# Patient Record
Sex: FEMALE | Race: WHITE
[De-identification: names, ages, dates, MRNs, and addresses within clinical notes are randomized per-mention and may not be internally consistent; named-entity substitution may affect disease eponyms.]

---

## 2021-07-06 ENCOUNTER — HOSPITAL ENCOUNTER (EMERGENCY)
Dept: HOSPITAL 61 - ER | Age: 61
Discharge: HOME | End: 2021-07-06
Payer: SELF-PAY

## 2021-07-06 VITALS — WEIGHT: 162.26 LBS | HEIGHT: 66 IN | BODY MASS INDEX: 26.08 KG/M2

## 2021-07-06 VITALS — DIASTOLIC BLOOD PRESSURE: 78 MMHG | SYSTOLIC BLOOD PRESSURE: 131 MMHG

## 2021-07-06 DIAGNOSIS — N39.0: Primary | ICD-10-CM

## 2021-07-06 DIAGNOSIS — Z98.51: ICD-10-CM

## 2021-07-06 DIAGNOSIS — K21.9: ICD-10-CM

## 2021-07-06 DIAGNOSIS — Z88.0: ICD-10-CM

## 2021-07-06 DIAGNOSIS — Z90.49: ICD-10-CM

## 2021-07-06 DIAGNOSIS — F17.200: ICD-10-CM

## 2021-07-06 LAB
APTT PPP: YELLOW S
BACTERIA #/AREA URNS HPF: (no result) /HPF
BILIRUB UR QL STRIP: NEGATIVE
FIBRINOGEN PPP-MCNC: (no result) MG/DL
NITRITE UR QL STRIP: POSITIVE
PH UR STRIP: 6.5 [PH]
PROT UR STRIP-MCNC: 100 MG/DL
RBC #/AREA URNS HPF: (no result) /HPF (ref 0–2)
UROBILINOGEN UR-MCNC: 1 MG/DL
WBC #/AREA URNS HPF: (no result) /HPF (ref 0–4)

## 2021-07-06 PROCEDURE — 99283 EMERGENCY DEPT VISIT LOW MDM: CPT

## 2021-07-06 PROCEDURE — 81001 URINALYSIS AUTO W/SCOPE: CPT

## 2021-07-06 PROCEDURE — 87086 URINE CULTURE/COLONY COUNT: CPT

## 2021-07-06 NOTE — PHYS DOC
Past Medical History


Past Medical History:  Depression, GERD


Past Surgical History:  Cholecystectomy, Tonsillectomy, Tubal ligation


Smoking Status:  Current Every Day Smoker


Additional Information:  


1 PPD


Alcohol Use:  None


Drug Use:  None





General Adult


EDM:


Chief Complaint:  PAIN ON URINATION





HPI:


HPI:





Patient is a 60-year-old female complaining for UTI symptoms.  She has had x1 

UTI in the past and reports symptoms today feel similar.  Onset was 3 days ago 

without any known inciting event, trauma, ingestion or known exposure.  Reports 

dysuria, increased frequency and at times incomplete bladder emptying.  No fever

or flank pain.  No other comorbid conditions, takes no medications on a daily 

basis.  No vaginal bleeding or discharge





Review of Systems:


Review of Systems:


Fourteen body systems of review of systems have been reviewed. See HPI for 

pertinent positives and negative responses, other wise all other systems are 

negative, non-pertinent or non-contributory





Heart Score:


C/O Chest Pain:  No


Risk Factors:


Risk Factors:  DM, Current or recent (<one month) smoker, HTN, HLP, family his

tory of CAD, obesity.


Risk Scores:


Score 0 - 3:  2.5% MACE over next 6 weeks - Discharge Home


Score 4 - 6:  20.3% MACE over next 6 weeks - Admit for Clinical Observation


Score 7 - 10:  72.7% MACE over next 6 weeks - Early Invasive Strategies





Allergies:


Allergies:





Allergies








Coded Allergies Type Severity Reaction Last Updated Verified


 


  Penicillins Allergy Intermediate RASH 7/6/21 Yes











Physical Exam:


PE:


Constitutional: Well developed, well nourished, no acute distress, non-toxic 

appearance. 


HENT: Normocephalic, atraumatic, bilateral external ears normal, oropharynx 

moist, no oral exudates, nose normal. 


Eyes: PERRLA, EOMI, conjunctiva normal, no discharge.  


Neck: Normal range of motion, no tenderness, supple, no stridor.  


Cardiovascular: Heart rate regular, sinus rhythm, no murmurs rubs or gallops


Lungs & Thorax:  Bilateral breath sounds clear to auscultation 


Abdomen: Bowel sounds normal, soft, suprapubic tenderness with palpation, no 

guarding or rebound, no masses, no pulsatile masses.  Nonsurgical abdomen, no 

peritoneal signs


Skin: Warm, dry, no erythema, no rash.  


Back: No tenderness, no CVA tenderness.  


Extremities: No tenderness, no cyanosis, no clubbing, ROM intact, no edema.  


Neurologic: Alert and oriented X 3, grossly normal motor & sensory function, no 

focal deficits noted. 


Psychologic: Anxious affect and mood





Current Patient Data:


Labs:





Laboratory Tests








Test


 7/6/21


16:23


 


Urine Collection Type Unknown 


 


Urine Color Yellow 


 


Urine Clarity Cloudy 


 


Urine pH 6.5 


 


Urine Specific Gravity 1.010 


 


Urine Protein 100 mg/dL 


 


Urine Glucose (UA) Negative mg/dL 


 


Urine Ketones (Stick) Negative mg/dL 


 


Urine Blood Large 


 


Urine Nitrite Positive 


 


Urine Bilirubin Negative 


 


Urine Urobilinogen Dipstick 1.0 mg/dL 


 


Urine Leukocyte Esterase Large 


 


Urine RBC 20-40 /HPF 


 


Urine WBC Tntc /HPF 


 


Urine Squamous Epithelial


Cells Few /LPF 





 


Urine Bacteria Few /HPF 








Vital Signs:





                                   Vital Signs








  Date Time  Temp Pulse Resp B/P (MAP) Pulse Ox O2 Delivery O2 Flow Rate FiO2


 


7/6/21 16:08 98.2 105 20 131/78 (96) 97 Room Air  





 98.2       











EKG:


EKG:


[]





Radiology/Procedures:


Radiology/Procedures:


[]





Course & Med Decision Making:


Course & Med Decision Making


ABCs unremarkable. I disclosed entirety of ER findings and discussed most likely

 diagnosis of UTI.  I discussed antibiotic choices of uncomplicated versus 

complicated, joint decision to pursue uncomplicated UTI treatment despite age 

and and otherwise healthy individual with Macrobid as this worked for her in the

 past.  I stressed need for close outpatient follow-up to review today's ER 

visit. Strict return precautions were also discussed at length with good 

understanding by patient. Patient voiced understanding and agreement with the 

plan. Patient knows to come back for repeat evaluation if concerning signs or 

symptoms present prior to outpatient follow-up. Hemodynamically stable, ambulato

ry and well-appearing at time of disposition.





Dragon Disclaimer:


Dragon Disclaimer:


This electronic medical record was generated, in whole or in part, using a voice

 recognition dictation system.





Departure


Departure


Impression:  


   Primary Impression:  


   UTI (urinary tract infection)


Disposition:  01 HOME / SELF CARE / HOMELESS


Condition:  STABLE


Referrals:  


NO PCP (PCP)


Patient Instructions:  Urinary Tract Infection





Additional Instructions:  


You were seen for a urinary tract infection. Please continue to take the 

antibiotics as prescribed. You should return to the ED if you develop worsening 

pain, fever, flank pain, or any other new or concerning symptoms. Follow up with

 primary care for further management if ongoing symptoms.


Scripts


Nitrofurantoin Monohyd/M-Cryst (MACROBID 100 MG CAPSULE) 100 Mg Capsule


1 CAP PO BID for 5 Days, #9 CAP 0 Refills


   Prov: ALEISHA CLARK DO         7/6/21











ALEISHA CLARK DO                  Jul 6, 2021 16:33